# Patient Record
Sex: FEMALE | Race: WHITE | Employment: UNEMPLOYED | URBAN - METROPOLITAN AREA
[De-identification: names, ages, dates, MRNs, and addresses within clinical notes are randomized per-mention and may not be internally consistent; named-entity substitution may affect disease eponyms.]

---

## 2017-12-15 ENCOUNTER — OFFICE VISIT (OUTPATIENT)
Dept: INTERNAL MEDICINE CLINIC | Age: 27
End: 2017-12-15

## 2017-12-15 VITALS
BODY MASS INDEX: 31.77 KG/M2 | HEIGHT: 64 IN | HEART RATE: 105 BPM | TEMPERATURE: 98.2 F | DIASTOLIC BLOOD PRESSURE: 91 MMHG | WEIGHT: 186.1 LBS | RESPIRATION RATE: 16 BRPM | SYSTOLIC BLOOD PRESSURE: 144 MMHG

## 2017-12-15 DIAGNOSIS — G43.001 MIGRAINE WITHOUT AURA AND WITH STATUS MIGRAINOSUS, NOT INTRACTABLE: ICD-10-CM

## 2017-12-15 DIAGNOSIS — Z28.21 INFLUENZA VACCINE REFUSED: ICD-10-CM

## 2017-12-15 DIAGNOSIS — F41.9 ANXIETY: ICD-10-CM

## 2017-12-15 DIAGNOSIS — J45.20 MILD INTERMITTENT ASTHMA WITHOUT COMPLICATION: ICD-10-CM

## 2017-12-15 DIAGNOSIS — M79.7 FIBROSITIS: Primary | ICD-10-CM

## 2017-12-15 RX ORDER — ALBUTEROL SULFATE 0.83 MG/ML
SOLUTION RESPIRATORY (INHALATION)
Qty: 60 EACH | Refills: 5 | Status: SHIPPED | OUTPATIENT
Start: 2017-12-15

## 2017-12-15 RX ORDER — ALPRAZOLAM 0.5 MG/1
TABLET ORAL
Qty: 30 TAB | Refills: 0 | Status: SHIPPED | OUTPATIENT
Start: 2017-12-15

## 2017-12-15 RX ORDER — NORTRIPTYLINE HYDROCHLORIDE 25 MG/1
CAPSULE ORAL
Qty: 90 CAP | Refills: 5 | Status: SHIPPED | OUTPATIENT
Start: 2017-12-15

## 2017-12-15 NOTE — MR AVS SNAPSHOT
Visit Information Date & Time Provider Department Dept. Phone Encounter #  
 12/15/2017 10:15 AM Annamaria Mariee MD [facility] [phone] [id number] Upcoming Health Maintenance Date Due  
 PAP AKA CERVICAL CYTOLOGY 4/17/2017 Influenza Age 5 to Adult 8/1/2017 DTaP/Tdap/Td series (2 - Td) 9/1/2024 Allergies as of 12/15/2017  Review Complete On: 12/15/2017 By: Noemy Zepeda Severity Noted Reaction Type Reactions Amoxicillin Medium 09/15/2011    Hives Penicillins Medium 09/15/2011    Hives Current Immunizations  Never Reviewed Name Date Influenza Vaccine 11/13/2014  4:44 PM  
 Pneumococcal Polysaccharide (PPSV-23) 9/1/2014  4:30 PM  
 Tdap 9/1/2014  4:29 PM  
  
 Not reviewed this visit You Were Diagnosed With   
  
 Codes Comments Fibrositis    -  Primary ICD-10-CM: M79.7 ICD-9-CM: 729.0 Migraine without aura and with status migrainosus, not intractable     ICD-10-CM: G43.001 ICD-9-CM: 346.12 Mild intermittent asthma without complication     AQK-80-KD: J45.20 ICD-9-CM: 493.90 Anxiety     ICD-10-CM: F41.9 ICD-9-CM: 300.00 Vitals BP Pulse Temp Resp Height(growth percentile) Weight(growth percentile) (!) 144/91 (BP 1 Location: Left arm, BP Patient Position: At rest) (!) 105 98.2 °F (36.8 °C) (Oral) 16 5' 4\" (1.626 m) 186 lb 1.6 oz (84.4 kg) PF Breastfeeding? BMI OB Status Smoking Status 98 L/min No 31.94 kg/m2 Having regular periods Current Every Day Smoker BMI and BSA Data Body Mass Index Body Surface Area 31.94 kg/m 2 1.95 m 2 Preferred Pharmacy Pharmacy Name Phone [facility] [address] Your Updated Medication List  
  
   
This list is accurate as of: 12/15/17 10:48 AM.  Always use your most recent med list.  
  
  
  
  
 albuterol 2.5 mg /3 mL (0.083 %) nebulizer solution Commonly known as:  PROVENTIL VENTOLIN Use one (1) 3mL vial every four (4) hours as needed for wheezing or shortness of breath. ALPRAZolam 0.5 mg tablet Commonly known as:  XANAX  
TAKE 1 TABLET BY MOUTH 3 TIMES A DAY AS NEEDED  
  
 nortriptyline 25 mg capsule Commonly known as:  PAMELOR  
TAKE 3 CAPSULES BY MOUTH NIGHTLY ZyrTEC 10 mg Cap Generic drug:  Cetirizine Take  by mouth. Prescriptions Printed Refills ALPRAZolam (XANAX) 0.5 mg tablet 0 Sig: TAKE 1 TABLET BY MOUTH 3 TIMES A DAY AS NEEDED Class: Print Prescriptions Sent to Pharmacy Refills  
 nortriptyline (PAMELOR) 25 mg capsule 5 Sig: TAKE 3 CAPSULES BY MOUTH NIGHTLY Class: Normal  
 Pharmacy: [id number] [place] [address] Ph #: [phone]  
 albuterol (PROVENTIL VENTOLIN) 2.5 mg /3 mL (0.083 %) nebulizer solution 5 Sig: Use one (1) 3mL vial every four (4) hours as needed for wheezing or shortness of breath. Class: Normal  
 Pharmacy: [id number] [place] [address] Ph #: [phone] [facility]! Dear Mendel Ruvalcaba: Thank you for requesting a [employer] account. Our records indicate that you already have an active [employer] account. You can access your account anytime at [email or web address] Did you know that you can access your hospital and ER discharge instructions at any time in [employer]? You can also review all of your test results from your hospital stay or ER visit. Additional Information If you have questions, please visit the Frequently Asked Questions section of the [employer] website at [email or web address] Remember, [employer] is NOT to be used for urgent needs. For medical emergencies, dial 911. Now available from your iPhone and Android! Please provide this summary of care documentation to your next provider. Your primary care clinician is listed as [id number] [address] [place] [address] [address]. If you have any questions after today's visit, please call [phone].

## 2017-12-15 NOTE — PROGRESS NOTES
1. Have you been to the ER, urgent care clinic since your last visit? Hospitalized since your last visit? No    2. Have you seen or consulted any other health care providers outside of the [address] [place] [address] since your last visit? Include any pap smears or colon screening.  No   Chief Complaint   Patient presents with    Medication Refill     Fasting

## 2017-12-15 NOTE — PROGRESS NOTES
HISTORY OF PRESENT ILLNESS  Shashi Benedict is a 32 y.o. female. HPI  Here for fibrositis. She has done well with pamelor. She has migraines once a quarter now which is much improved. She has less depression. She uses xanax sparingly for anxiety. She is due for a PAP but has not insurance. Her asthma is mild but she needs refills for her nebulizer as proair was $70.  Past Medical History:   Diagnosis Date    Allergic rhinitis due to pollen     Asthma     Depression     Fibromyalgia     Heart abnormalities     [place] during childhood    HX OTHER MEDICAL     asthma      Current Outpatient Prescriptions   Medication Sig    nortriptyline (PAMELOR) 25 mg capsule TAKE 3 CAPSULES BY MOUTH NIGHTLY    ALPRAZolam (XANAX) 0.5 mg tablet TAKE 1 TABLET BY MOUTH 3 TIMES A DAY AS NEEDED    albuterol (PROVENTIL VENTOLIN) 2.5 mg /3 mL (0.083 %) nebulizer solution Use one (1) 3mL vial every four (4) hours as needed for wheezing or shortness of breath.  Cetirizine (ZYRTEC) 10 mg cap Take  by mouth. No current facility-administered medications for this visit. Review of Systems   All other systems reviewed and are negative. Visit Vitals    BP (!) 144/91 (BP 1 Location: Left arm, BP Patient Position: At rest)    Pulse (!) 105    Temp 98.2 °F (36.8 °C) (Oral)    Resp 16    Ht 5' 4\" (1.626 m)    Wt 186 lb 1.6 oz (84.4 kg)    PF 98 L/min    Breastfeeding No    BMI 31.94 kg/m2       Physical Exam   Constitutional: She appears well-developed and well-nourished. Cardiovascular: Normal rate, regular rhythm and normal heart sounds. Pulmonary/Chest: Effort normal and breath sounds normal. No respiratory distress. She has no wheezes. She has no rales. Psychiatric: She has a normal mood and affect. Her behavior is normal. Judgment and thought content normal.   Nursing note and vitals reviewed. ASSESSMENT and PLAN  Diagnoses and all orders for this visit:    1.  Fibrositis  -     nortriptyline (PAMELOR) 25 mg capsule; TAKE 3 CAPSULES BY MOUTH NIGHTLY  The current medical regimen is effective;  continue present plan and medications. 2. Migraine without aura and with status migrainosus, not intractable  The current medical regimen is effective;  continue present plan and medications. 3. Mild intermittent asthma without complication  -     albuterol (PROVENTIL VENTOLIN) 2.5 mg /3 mL (0.083 %) nebulizer solution; Use one (1) 3mL vial every four (4) hours as needed for wheezing or shortness of breath. The current medical regimen is effective;  continue present plan and medications. 4. Anxiety  -     ALPRAZolam (XANAX) 0.5 mg tablet; TAKE 1 TABLET BY MOUTH 3 TIMES A DAY AS NEEDED  The current medical regimen is effective;  continue present plan and medications. She declines a flu shot today.   Reviewed plan of care with the patient who has provided input and agrees with the goals